# Patient Record
Sex: MALE | Race: BLACK OR AFRICAN AMERICAN
[De-identification: names, ages, dates, MRNs, and addresses within clinical notes are randomized per-mention and may not be internally consistent; named-entity substitution may affect disease eponyms.]

---

## 2020-03-13 ENCOUNTER — HOSPITAL ENCOUNTER (EMERGENCY)
Dept: HOSPITAL 72 - EMR | Age: 20
LOS: 1 days | Discharge: HOME | End: 2020-03-14
Payer: MEDICAID

## 2020-03-13 VITALS — DIASTOLIC BLOOD PRESSURE: 77 MMHG | SYSTOLIC BLOOD PRESSURE: 131 MMHG

## 2020-03-13 VITALS — WEIGHT: 150 LBS | BODY MASS INDEX: 22.73 KG/M2 | HEIGHT: 68 IN

## 2020-03-13 DIAGNOSIS — L02.416: Primary | ICD-10-CM

## 2020-03-13 PROCEDURE — 10060 I&D ABSCESS SIMPLE/SINGLE: CPT

## 2020-03-13 PROCEDURE — 99282 EMERGENCY DEPT VISIT SF MDM: CPT

## 2020-03-14 VITALS — SYSTOLIC BLOOD PRESSURE: 131 MMHG | DIASTOLIC BLOOD PRESSURE: 77 MMHG

## 2020-03-14 NOTE — EMERGENCY ROOM REPORT
History of Present Illness


General


Chief Complaint:  Skin Rash/Abscess


Source:  Patient





Present Illness


HPI


19-year-old male presents the ED with boil to left inner thigh for the last 2 

days.  States this started like a bump and then he "popped it".  States it got 

more painful.  Pain is dull, 9 out of 10, nonradiating.  Denies fevers or 

chills.  Noted some discharge initially.  Denies sick contacts or recent 

travel.  No other aggravating relieving factors.  Denies any other associated 

symptoms


COVID-19 risk:Travel to affect:  No


Has patient experienced corona:  No


Allergies:  


Coded Allergies:  


     No Known Allergies (Unverified , 12/9/13)





Patient History


Past Medical History:  none


Past Surgical History:  none


Pertinent Family History:  none


Social History:  Denies: smoking, alcohol use, drug use


Immunizations:  UTD


Reviewed Nursing Documentation:  PMH: Agreed; PSxH: Agreed





Nursing Documentation-PMH


Past Medical History:  No Stated History


Hx Cardiac Problems:  No


Hx Gastrointestinal Problems:  No


Hx Neurological Problems:  No





Review of Systems


All Other Systems:  negative except mentioned in HPI





Physical Exam





Vital Signs








  Date Time  Temp Pulse Resp B/P (MAP) Pulse Ox O2 Delivery O2 Flow Rate FiO2


 


3/13/20 23:28 97.9 86 20 131/77 (95) 97 Room Air  








Sp02 EP Interpretation:  reviewed, normal


General Appearance:  no apparent distress, alert, GCS 15, non-toxic


Head:  normocephalic


Eyes:  bilateral eye normal inspection, bilateral eye PERRL


ENT:  normal ENT inspection


Neck:  normal inspection


Respiratory:  normal inspection


Cardiovascular #1:  normal inspection


Gastrointestinal:  normal inspection


Rectal:  deferred


Genitourinary:  no CVA tenderness


Musculoskeletal:  normal inspection


Neurologic:  alert, motor strength/tone normal, oriented x3, sensory intact, 

responsive, speech normal


Psychiatric:  normal inspection


Skin:  other - abscess to L inner thigh. surrounding erythema/induration


Lymphatic:  normal inspection





Procedures


Incision and Drainage


Incision and Drainage :  


   Consent:  Verbal


   Blade Size:  11


   I & D Procedure:  betadine prep, sterile drapes applied, sterile dressing 

applied, gauze wick placed


   Wound Location:  lower extremity - L thigh


   Wound's Depth, Shape:  other - abscess


   Wound Explored:  purulent discharge


   Anesthesia:  1% Lidocaine


   Splint Applied?:  No


   Sling Applied?:  No


   Patient Tolerated:  Well


   Complications:  None





Medical Decision Making


Diagnostic Impression:  


 Primary Impression:  


 Abscess of thigh


ER Course


Hospital Course 


20 yo M presents with pain/swelling L thigh





Clinical course


Patient placed on stretcher.  After initial history and physical I ordered 

topical L.E.T and lidocaine





After area anesthetized I used a scalpel to incise the abscess.  Use forceps to 

break up any loculations.  Small amount of purulent discharge expressed.  

Patient tolerated procedure without complication.  Dressing applied.





Discussed findings with patient.  Given pain meds and antibiotics here.  Will 

discharge home with antibiotics.  Safe for discharge with close outpatient 

follow-up.  Does not have a PMD.  I will provide referrals





Diagnosis - abscess of thigh 





Stable and discharged to home with prescription for augmentin, Pyatt.  wound 

Care instructions given. Followup with PMD.  Return to ED if any signs of 

infection develop





Last Vital Signs








  Date Time  Temp Pulse Resp B/P (MAP) Pulse Ox O2 Delivery O2 Flow Rate FiO2


 


3/13/20 23:41 97.9 83 20 131/77 97 Room Air  








Status:  improved


Disposition:  HOME, SELF-CARE


Condition:  Stable


Scripts


Amoxicillin/Potassium Clav 875-125* (AUGMENTIN 875-125 TABLET*) 1 Each Tablet


1 TAB ORAL TWICE A DAY, #14 TAB


   Prov: Shen Parkinson MD         3/14/20 


Hydrocodone Bit/Acetaminophen 5-325* (NORCO 5-325 TABLET*) 1 Each Tablet


1 TAB ORAL Q6H PRN for FOR PAIN, #10 TAB 0 Refills


   Prov: Shen Parkinson MD         3/14/20


Referrals:  


[facility]ERR[facility] (PCP)











Shen Parkinson MD Mar 14, 2020 00:13